# Patient Record
Sex: MALE | Race: BLACK OR AFRICAN AMERICAN
[De-identification: names, ages, dates, MRNs, and addresses within clinical notes are randomized per-mention and may not be internally consistent; named-entity substitution may affect disease eponyms.]

---

## 2020-11-06 ENCOUNTER — HOSPITAL ENCOUNTER (EMERGENCY)
Dept: HOSPITAL 72 - EMR | Age: 45
Discharge: HOME | End: 2020-11-06
Payer: COMMERCIAL

## 2020-11-06 VITALS — BODY MASS INDEX: 44.1 KG/M2 | HEIGHT: 71 IN | WEIGHT: 315 LBS

## 2020-11-06 VITALS — DIASTOLIC BLOOD PRESSURE: 81 MMHG | SYSTOLIC BLOOD PRESSURE: 131 MMHG

## 2020-11-06 VITALS — DIASTOLIC BLOOD PRESSURE: 85 MMHG | SYSTOLIC BLOOD PRESSURE: 137 MMHG

## 2020-11-06 DIAGNOSIS — S63.502A: ICD-10-CM

## 2020-11-06 DIAGNOSIS — S16.1XXA: Primary | ICD-10-CM
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
## 2020-11-06 DIAGNOSIS — S39.012A: ICD-10-CM

## 2020-11-06 PROCEDURE — 72125 CT NECK SPINE W/O DYE: CPT

## 2020-11-06 PROCEDURE — 72128 CT CHEST SPINE W/O DYE: CPT

## 2020-11-06 PROCEDURE — 74176 CT ABD & PELVIS W/O CONTRAST: CPT

## 2020-11-06 PROCEDURE — 99284 EMERGENCY DEPT VISIT MOD MDM: CPT
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
## 2020-11-06 NOTE — DIAGNOSTIC IMAGING REPORT
EXAM:

  CT Cervical Spine Without Intravenous Contrast

 

CLINICAL HISTORY:

  TRAUMA

 

TECHNIQUE:

  Axial computed tomography images of the cervical spine without 

intravenous contrast.  CTDI is 30.4 mGy and DLP is 763.3 mGy-cm.  One or 

more of the following dose reduction techniques were used: automated 

exposure control, adjustment of the mA and/or kV according to patient 

size, use of iterative reconstruction technique.

 

COMPARISON:

  No relevant prior studies available.

 

FINDINGS:

  Vertebrae:  Unremarkable.  No acute fracture.

  Discs/spinal canal/neural foramina:  No acute findings.  No spinal 

canal stenosis.

  Soft tissues:  Unremarkable.

 

IMPRESSION:     

  No acute fracture or traumatic malalignment of the cervical spine.
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
## 2020-11-06 NOTE — DIAGNOSTIC IMAGING REPORT
EXAM:

  CT Thoracic Spine Without Intravenous Contrast

 

CLINICAL HISTORY:

  TRAUMA

 

TECHNIQUE:

  Axial computed tomography images of the thoracic spine without 

intravenous contrast.  CTDI is 28.8 mGy and DLP is 1270.6 mGy-cm.  One or 

more of the following dose reduction techniques were used: automated 

exposure control, adjustment of the mA and/or kV according to patient 

size, use of iterative reconstruction technique.

 

COMPARISON:

  No relevant prior studies available.

 

FINDINGS:

  Vertebrae:  Unremarkable.  No acute fracture.

  Discs/spinal canal/neural foramina:  No acute findings.  No spinal 

canal stenosis.

  Soft tissues:  Unremarkable.

 

IMPRESSION:     

  No acute fracture or traumatic malalignment of the thoracic spine.

## 2020-11-06 NOTE — NUR
ER DISCHARGE NOTE:

Patient is cleared to be discharged per ERMD, pt is aox4, on room air, with 
stable vital signs. pt was given dc and prescription instructions, pt was able 
to verbalize understanding, pt id band removed without complications. pt is 
able to ambulate with steady gait. pt took all belongings. placed wrist brace 
on left wrist.
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
## 2020-11-06 NOTE — DIAGNOSTIC IMAGING REPORT
EXAM:

  CT Abdomen and Pelvis Without Intravenous Contrast

 

CLINICAL HISTORY:

  TRAUMA

 

TECHNIQUE:

  Axial computed tomography images of the abdomen and pelvis without 

intravenous contrast.  CTDI is 26.6 mGy and DLP is 1684.3 mGy-cm.  One or 

more of the following dose reduction techniques were used: automated 

exposure control, adjustment of the mA and/or kV according to patient 

size, use of iterative reconstruction technique.

 

COMPARISON:

  No relevant prior studies available.

 

FINDINGS: Limited sensitivity for solid organ injury in the absence of 

intravenous contrast.

  Lung bases:  Unremarkable.  No mass.  No consolidation.

 

 ABDOMEN:

  Liver:  Calcified granuloma right hepatic lobe.

  Gallbladder and bile ducts:  Unremarkable.  No calcified stones.  No 

ductal dilation.

  Pancreas:  Unremarkable.  No ductal dilation.

  Spleen:  Unremarkable.  No splenomegaly.

  Adrenals:  Unremarkable.  No mass.

  Kidneys and ureters:  Unremarkable.  No obstructing stones.  No 

hydronephrosis.

  Stomach and bowel:  Unremarkable.  No obstruction.  No mucosal 

thickening.

 

 PELVIS:

  Appendix:  No findings to suggest acute appendicitis.

  Bladder:  Unremarkable.  No stones.

  Reproductive:  Unremarkable as visualized.

 

 ABDOMEN and PELVIS:

  Intraperitoneal space:  Unremarkable.  No free air.  No significant 

fluid collection.

  Bones/joints:  No acute fracture.  No dislocation.

  Soft tissues:  Unremarkable.

  Vasculature:  Unremarkable.  No abdominal aortic aneurysm.

  Lymph nodes:  Unremarkable.  No enlarged lymph nodes.

 

IMPRESSION:     

  No traumatic abnormality within the abdomen or pelvis.